# Patient Record
Sex: FEMALE | Race: OTHER | NOT HISPANIC OR LATINO | URBAN - METROPOLITAN AREA
[De-identification: names, ages, dates, MRNs, and addresses within clinical notes are randomized per-mention and may not be internally consistent; named-entity substitution may affect disease eponyms.]

---

## 2021-10-25 ENCOUNTER — OFFICE VISIT (OUTPATIENT)
Dept: URBAN - METROPOLITAN AREA CLINIC 63 | Facility: CLINIC | Age: 66
End: 2021-10-25

## 2022-01-27 ENCOUNTER — OFFICE VISIT (OUTPATIENT)
Dept: URBAN - METROPOLITAN AREA SURGERY CENTER 4 | Facility: SURGERY CENTER | Age: 67
End: 2022-01-27

## 2022-01-31 LAB — PATHOLOGY (INDENTED REPORT): (no result)

## 2022-02-24 ENCOUNTER — OFFICE VISIT (OUTPATIENT)
Dept: URBAN - METROPOLITAN AREA CLINIC 60 | Facility: CLINIC | Age: 67
End: 2022-02-24

## 2022-03-24 ENCOUNTER — OFFICE VISIT (OUTPATIENT)
Dept: URBAN - METROPOLITAN AREA CLINIC 60 | Facility: CLINIC | Age: 67
End: 2022-03-24

## 2022-04-26 ENCOUNTER — OFFICE VISIT (OUTPATIENT)
Dept: URBAN - METROPOLITAN AREA CLINIC 60 | Facility: CLINIC | Age: 67
End: 2022-04-26

## 2022-07-09 ENCOUNTER — TELEPHONE ENCOUNTER (OUTPATIENT)
Dept: URBAN - METROPOLITAN AREA CLINIC 121 | Facility: CLINIC | Age: 67
End: 2022-07-09

## 2022-07-09 RX ORDER — DAPAGLIFLOZIN 5 MG/1
TABLET, FILM COATED ORAL
Refills: 0 | OUTPATIENT
Start: 2021-09-01 | End: 2021-10-25

## 2022-07-09 RX ORDER — METFORMIN HYDROCHLORIDE 850 MG/1
TABLET ORAL TWICE A DAY
Refills: 0 | OUTPATIENT
Start: 2017-06-28 | End: 2017-08-08

## 2022-07-09 RX ORDER — OMEPRAZOLE 20 MG/1
CAPSULE, DELAYED RELEASE ORAL
Refills: 0 | OUTPATIENT
Start: 2021-08-31 | End: 2021-10-25

## 2022-07-09 RX ORDER — OMEPRAZOLE 40 MG/1
CAPSULE, DELAYED RELEASE ORAL
Refills: 0 | OUTPATIENT
Start: 2017-10-30 | End: 2017-11-30

## 2022-07-09 RX ORDER — SIMVASTATIN 40 MG/1
TABLET, FILM COATED ORAL
Refills: 0 | OUTPATIENT
Start: 2017-11-30 | End: 2017-11-30

## 2022-07-09 RX ORDER — GABAPENTIN 100 MG/1
3 CAPSULE HS CAPSULE ORAL
Refills: 0 | OUTPATIENT
Start: 2017-11-30 | End: 2017-12-28

## 2022-07-09 RX ORDER — ACETAMINOPHEN/DIPHENHYDRAMINE 500MG-25MG
TABLET ORAL
Refills: 0 | OUTPATIENT
Start: 2017-06-28 | End: 2017-08-08

## 2022-07-09 RX ORDER — OMEPRAZOLE 40 MG/1
CAPSULE, DELAYED RELEASE ORAL
Refills: 0 | OUTPATIENT
Start: 2017-08-08 | End: 2017-10-30

## 2022-07-09 RX ORDER — SIMVASTATIN 40 MG/1
TABLET, FILM COATED ORAL
Refills: 0 | OUTPATIENT
Start: 2017-08-08 | End: 2017-10-30

## 2022-07-09 RX ORDER — ASPIRIN 81 MG/1
TABLET, CHEWABLE ORAL TWICE A DAY
Refills: 0 | OUTPATIENT
Start: 2017-12-28 | End: 2021-10-25

## 2022-07-09 RX ORDER — HYDROCHLOROTHIAZIDE 12.5 MG/1
TABLET ORAL
Refills: 0 | OUTPATIENT
Start: 2021-08-31 | End: 2021-10-25

## 2022-07-09 RX ORDER — SPIRONOLACTONE 25 MG
TABLET ORAL
Refills: 0 | OUTPATIENT
Start: 2017-06-28 | End: 2017-08-08

## 2022-07-09 RX ORDER — HYDROCHLOROTHIAZIDE 12.5 MG/1
TABLET ORAL
Refills: 0 | OUTPATIENT
Start: 2017-12-28 | End: 2021-10-25

## 2022-07-09 RX ORDER — SIMVASTATIN 40 MG/1
TABLET, FILM COATED ORAL
Refills: 0 | OUTPATIENT
Start: 2017-11-30 | End: 2017-12-28

## 2022-07-09 RX ORDER — METFORMIN HCL 500 MG/1
TABLET ORAL
Refills: 0 | OUTPATIENT
Start: 2021-08-31 | End: 2021-10-25

## 2022-07-09 RX ORDER — HYDROCHLOROTHIAZIDE 12.5 MG/1
TABLET ORAL
Refills: 0 | OUTPATIENT
Start: 2017-10-30 | End: 2017-11-30

## 2022-07-09 RX ORDER — SUCRALFATE 1 G/1
TABLET ORAL TWICE A DAY
Refills: 0 | OUTPATIENT
Start: 2017-12-28 | End: 2021-10-25

## 2022-07-09 RX ORDER — SPIRONOLACTONE 25 MG
TABLET ORAL
Refills: 0 | OUTPATIENT
Start: 2017-11-30 | End: 2017-12-28

## 2022-07-09 RX ORDER — SIMVASTATIN 40 MG/1
TABLET, FILM COATED ORAL
Refills: 0 | OUTPATIENT
Start: 2017-12-28 | End: 2021-10-25

## 2022-07-09 RX ORDER — ASPIRIN 81 MG/1
TABLET, CHEWABLE ORAL TWICE A DAY
Refills: 0 | OUTPATIENT
Start: 2017-11-30 | End: 2017-12-28

## 2022-07-09 RX ORDER — SPIRONOLACTONE 25 MG
TABLET ORAL
Refills: 0 | OUTPATIENT
Start: 2017-08-08 | End: 2017-10-30

## 2022-07-09 RX ORDER — HYDROCHLOROTHIAZIDE 12.5 MG/1
TABLET ORAL
Refills: 0 | OUTPATIENT
Start: 2017-08-08 | End: 2017-10-30

## 2022-07-09 RX ORDER — OMEPRAZOLE 40 MG/1
CAPSULE, DELAYED RELEASE ORAL
Refills: 0 | OUTPATIENT
Start: 2017-06-28 | End: 2017-08-08

## 2022-07-09 RX ORDER — LOSARTAN POTASSIUM 100 MG/1
TABLET, FILM COATED ORAL
Refills: 0 | OUTPATIENT
Start: 2017-12-28 | End: 2021-10-25

## 2022-07-09 RX ORDER — HYDROCHLOROTHIAZIDE 12.5 MG/1
TABLET ORAL
Refills: 0 | OUTPATIENT
Start: 2017-11-30 | End: 2017-12-28

## 2022-07-09 RX ORDER — LOSARTAN POTASSIUM 100 MG/1
TABLET, FILM COATED ORAL
Refills: 0 | OUTPATIENT
Start: 2017-08-08 | End: 2017-10-30

## 2022-07-09 RX ORDER — SOLIFENACIN SUCCINATE 5 MG/1
TABLET, FILM COATED ORAL
Refills: 0 | OUTPATIENT
Start: 2021-08-31 | End: 2021-10-25

## 2022-07-09 RX ORDER — OMEPRAZOLE 40 MG/1
TAKE ONE CAPSULE BY MOUTH TWICE A DAY CAPSULE, DELAYED RELEASE ORAL
Refills: 0 | OUTPATIENT
Start: 2017-11-29 | End: 2017-11-30

## 2022-07-09 RX ORDER — GABAPENTIN 100 MG/1
3 CAPSULE HS CAPSULE ORAL
Refills: 0 | OUTPATIENT
Start: 2017-12-28 | End: 2021-10-25

## 2022-07-09 RX ORDER — SPIRONOLACTONE 25 MG
TABLET ORAL
Refills: 0 | OUTPATIENT
Start: 2017-12-28 | End: 2021-10-25

## 2022-07-09 RX ORDER — CETIRIZINE HYDROCHLORIDE 5 MG/1
TABLET ORAL TWICE A DAY
Refills: 0 | OUTPATIENT
Start: 2017-06-28 | End: 2017-08-08

## 2022-07-09 RX ORDER — LOSARTAN POTASSIUM 100 MG/1
TABLET, FILM COATED ORAL
Refills: 0 | OUTPATIENT
Start: 2021-08-31 | End: 2021-10-25

## 2022-07-09 RX ORDER — LOSARTAN POTASSIUM 100 MG/1
TABLET, FILM COATED ORAL
Refills: 0 | OUTPATIENT
Start: 2017-10-30 | End: 2017-11-30

## 2022-07-09 RX ORDER — LOSARTAN POTASSIUM 100 MG/1
TABLET, FILM COATED ORAL
Refills: 0 | OUTPATIENT
Start: 2017-11-30 | End: 2017-12-28

## 2022-07-09 RX ORDER — SIMVASTATIN 40 MG/1
TABLET, FILM COATED ORAL
Refills: 0 | OUTPATIENT
Start: 2017-10-30 | End: 2017-11-30

## 2022-07-09 RX ORDER — SPIRONOLACTONE 25 MG
TABLET ORAL
Refills: 0 | OUTPATIENT
Start: 2017-10-30 | End: 2017-11-30

## 2022-07-09 RX ORDER — CLOTRIMAZOLE AND BETAMETHASONE DIPROPIONATE 10; .5 MG/G; MG/G
CREAM TOPICAL TWICE A DAY
Refills: 0 | OUTPATIENT
Start: 2021-10-25 | End: 2022-04-26

## 2022-07-09 RX ORDER — GABAPENTIN 100 MG/1
3 CAPSULE HS CAPSULE ORAL
Refills: 0 | OUTPATIENT
Start: 2017-06-28 | End: 2017-08-08

## 2022-07-09 RX ORDER — OMEPRAZOLE 40 MG/1
CAPSULE, DELAYED RELEASE ORAL TWICE A DAY
Refills: 0 | OUTPATIENT
Start: 2017-12-28 | End: 2021-10-25

## 2022-07-09 RX ORDER — ASPIRIN 81 MG/1
TABLET, CHEWABLE ORAL TWICE A DAY
Refills: 0 | OUTPATIENT
Start: 2017-08-08 | End: 2017-10-30

## 2022-07-09 RX ORDER — SUCRALFATE 1 G/1
TWICE A DAY TABLET ORAL TWICE A DAY
Refills: 0 | OUTPATIENT
Start: 2022-02-24 | End: 2022-04-26

## 2022-07-09 RX ORDER — SUCRALFATE 1 G/1
TWICE A DAY TABLET ORAL TWICE A DAY
Refills: 3 | OUTPATIENT
Start: 2017-11-30 | End: 2017-12-28

## 2022-07-09 RX ORDER — ACETAMINOPHEN/DIPHENHYDRAMINE 500MG-25MG
TABLET ORAL
Refills: 0 | OUTPATIENT
Start: 2017-11-30 | End: 2017-12-28

## 2022-07-09 RX ORDER — OMEPRAZOLE 40 MG/1
CAPSULE, DELAYED RELEASE ORAL TWICE A DAY
Refills: 0 | OUTPATIENT
Start: 2017-11-30 | End: 2017-12-28

## 2022-07-09 RX ORDER — LOSARTAN POTASSIUM 100 MG/1
TABLET, FILM COATED ORAL
Refills: 0 | OUTPATIENT
Start: 2017-06-28 | End: 2017-08-08

## 2022-07-09 RX ORDER — ACETAMINOPHEN/DIPHENHYDRAMINE 500MG-25MG
TABLET ORAL
Refills: 0 | OUTPATIENT
Start: 2017-08-08 | End: 2017-11-30

## 2022-07-09 RX ORDER — ACETAMINOPHEN/DIPHENHYDRAMINE 500MG-25MG
TABLET ORAL
Refills: 0 | OUTPATIENT
Start: 2017-12-28 | End: 2021-10-25

## 2022-07-09 RX ORDER — HYDROCHLOROTHIAZIDE 12.5 MG/1
TABLET ORAL
Refills: 0 | OUTPATIENT
Start: 2017-06-28 | End: 2017-08-08

## 2022-07-09 RX ORDER — GABAPENTIN 100 MG/1
3 CAPSULE HS CAPSULE ORAL
Refills: 0 | OUTPATIENT
Start: 2017-08-08 | End: 2017-11-30

## 2022-07-09 RX ORDER — OMEPRAZOLE 40 MG/1
TWICE A DAY CAPSULE, DELAYED RELEASE ORAL TWICE A DAY
Refills: 0 | OUTPATIENT
Start: 2017-10-30 | End: 2017-11-29

## 2022-07-09 RX ORDER — ASPIRIN 81 MG/1
TABLET, CHEWABLE ORAL TWICE A DAY
Refills: 0 | OUTPATIENT
Start: 2017-10-30 | End: 2017-11-30

## 2022-07-09 RX ORDER — ASPIRIN 81 MG/1
TABLET, CHEWABLE ORAL TWICE A DAY
Refills: 0 | OUTPATIENT
Start: 2017-06-28 | End: 2017-08-08

## 2022-07-10 ENCOUNTER — TELEPHONE ENCOUNTER (OUTPATIENT)
Dept: URBAN - METROPOLITAN AREA CLINIC 121 | Facility: CLINIC | Age: 67
End: 2022-07-10

## 2022-07-10 RX ORDER — AMOXICILLIN 500 MG/1
2 TWICE A DAY CAPSULE ORAL
Refills: 0 | Status: ACTIVE | COMMUNITY
Start: 2017-10-30

## 2022-07-10 RX ORDER — LOSARTAN POTASSIUM 100 MG/1
TABLET, FILM COATED ORAL ONCE A DAY
Refills: 0 | Status: ACTIVE | COMMUNITY
Start: 2021-10-25

## 2022-07-10 RX ORDER — VITAMIN B COMPLEX
CAPSULE ORAL ONCE A DAY
Refills: 0 | Status: ACTIVE | COMMUNITY
Start: 2021-10-25

## 2022-07-10 RX ORDER — OMEPRAZOLE 20 MG/1
CAPSULE, DELAYED RELEASE ORAL ONCE A DAY
Refills: 0 | Status: ACTIVE | COMMUNITY
Start: 2021-10-25

## 2022-07-10 RX ORDER — SPIRONOLACTONE 25 MG
TABLET ORAL ONCE A DAY
Refills: 0 | Status: ACTIVE | COMMUNITY
Start: 2021-10-25

## 2022-07-10 RX ORDER — SOLIFENACIN SUCCINATE 5 MG/1
TABLET, FILM COATED ORAL ONCE A DAY
Refills: 0 | Status: ACTIVE | COMMUNITY
Start: 2021-10-25

## 2022-07-10 RX ORDER — HYDROCHLOROTHIAZIDE 12.5 MG/1
TABLET ORAL ONCE A DAY
Refills: 0 | Status: ACTIVE | COMMUNITY
Start: 2021-10-25

## 2022-07-10 RX ORDER — CLARITHROMYCIN 500 MG/1
TWICE A DAY TABLET ORAL TWICE A DAY
Refills: 0 | Status: ACTIVE | COMMUNITY
Start: 2017-10-30

## 2022-07-10 RX ORDER — SUCRALFATE 1 G/1
TABLET ORAL TWICE A DAY
Refills: 0 | Status: ACTIVE | COMMUNITY
Start: 2022-04-26

## 2022-07-10 RX ORDER — METFORMIN HCL 500 MG/1
TABLET ORAL TWICE A DAY
Refills: 0 | Status: ACTIVE | COMMUNITY
Start: 2021-10-25

## 2022-07-10 RX ORDER — DAPAGLIFLOZIN 5 MG/1
TABLET, FILM COATED ORAL ONCE A DAY
Refills: 0 | Status: ACTIVE | COMMUNITY
Start: 2021-10-25

## 2022-07-10 RX ORDER — OMEPRAZOLE 20 MG/1
ONCE A DAY CAPSULE, DELAYED RELEASE ORAL ONCE A DAY
Refills: 0 | Status: ACTIVE | COMMUNITY
Start: 2022-04-26

## 2022-07-10 RX ORDER — OMEPRAZOLE 40 MG/1
ONCE A DAY CAPSULE, DELAYED RELEASE ORAL ONCE A DAY
Refills: 0 | Status: ACTIVE | COMMUNITY
Start: 2022-02-24

## 2022-08-10 ENCOUNTER — ERX REFILL RESPONSE (OUTPATIENT)
Dept: URBAN - METROPOLITAN AREA CLINIC 63 | Facility: CLINIC | Age: 67
End: 2022-08-10

## 2022-08-10 RX ORDER — OMEPRAZOLE 20 MG/1
TAKE ONE CAPSULE BY MOUTH EVERY DAY CAPSULE, DELAYED RELEASE ORAL
Qty: 90 CAPSULE | Refills: 0 | OUTPATIENT

## 2022-08-10 RX ORDER — OMEPRAZOLE 20 MG/1
ONCE A DAY CAPSULE, DELAYED RELEASE ORAL ONCE A DAY
Refills: 0 | OUTPATIENT

## 2023-03-14 ENCOUNTER — OFFICE VISIT (OUTPATIENT)
Dept: URBAN - METROPOLITAN AREA CLINIC 60 | Facility: CLINIC | Age: 68
End: 2023-03-14
Payer: MEDICARE

## 2023-03-14 VITALS
HEART RATE: 81 BPM | HEIGHT: 60 IN | DIASTOLIC BLOOD PRESSURE: 80 MMHG | TEMPERATURE: 97.9 F | OXYGEN SATURATION: 100 % | RESPIRATION RATE: 20 BRPM | WEIGHT: 182 LBS | BODY MASS INDEX: 35.73 KG/M2 | SYSTOLIC BLOOD PRESSURE: 130 MMHG

## 2023-03-14 DIAGNOSIS — K63.5 POLYP OF COLON, UNSPECIFIED PART OF COLON, UNSPECIFIED TYPE: ICD-10-CM

## 2023-03-14 DIAGNOSIS — K59.01 SLOW TRANSIT CONSTIPATION: ICD-10-CM

## 2023-03-14 PROBLEM — 35298007: Status: ACTIVE | Noted: 2023-03-14

## 2023-03-14 PROCEDURE — 99214 OFFICE O/P EST MOD 30 MIN: CPT | Performed by: NURSE PRACTITIONER

## 2023-03-14 RX ORDER — OMEPRAZOLE 40 MG/1
ONCE A DAY CAPSULE, DELAYED RELEASE ORAL ONCE A DAY
Refills: 0 | Status: ACTIVE | COMMUNITY
Start: 2022-02-24

## 2023-03-14 RX ORDER — LOSARTAN POTASSIUM 100 MG/1
TABLET, FILM COATED ORAL ONCE A DAY
Refills: 0 | Status: ACTIVE | COMMUNITY
Start: 2021-10-25

## 2023-03-14 RX ORDER — HYDROCHLOROTHIAZIDE 12.5 MG/1
TABLET ORAL ONCE A DAY
Refills: 0 | Status: ACTIVE | COMMUNITY
Start: 2021-10-25

## 2023-03-14 RX ORDER — METFORMIN HCL 500 MG/1
TABLET ORAL TWICE A DAY
Refills: 0 | Status: ACTIVE | COMMUNITY
Start: 2021-10-25

## 2023-03-14 RX ORDER — SOLIFENACIN SUCCINATE 5 MG/1
TABLET, FILM COATED ORAL ONCE A DAY
Refills: 0 | Status: ACTIVE | COMMUNITY
Start: 2021-10-25

## 2023-03-14 RX ORDER — VITAMIN B COMPLEX
CAPSULE ORAL ONCE A DAY
Refills: 0 | Status: ACTIVE | COMMUNITY
Start: 2021-10-25

## 2023-03-14 RX ORDER — DAPAGLIFLOZIN 5 MG/1
TABLET, FILM COATED ORAL ONCE A DAY
Refills: 0 | Status: ACTIVE | COMMUNITY
Start: 2021-10-25

## 2023-03-14 NOTE — HPI-HPI
Patient comes for screening colonoscopy, denies rectal bleeding or change in her bowel, denies family h/o colon cancer. Will plan colonoscopy. Discussion [Use for free text]. Discussed with patient the importance of remaining  upright after eating, avoid tight clothing, reduce stress, and elevate the head of the bed. Discussed dietary restrictions pertaining to Gastritis Information sheet reviewed and a copy provided.   8/17 Patient is here for his colonoscopy report and also with the complaint of symptoms of gastritis, and reflux. Colonoscopy with evidence of one hyperplastic polyp, diverticular disease, and internal hemorrhoids. Colon recall in 5 year. Patient will do diet and start PPI.  10/17 Patient EGD with evidence of medium H hernia, esophageal mucosa with  damage due to reflux and chronic gastritis with H Pylori infection, patient will do diet and treatment bellow.  11/17 Patient is feeling better of her symptoms of reflux and gastritis, she has finished her treatment for H Pylori. Plan: Patient will do diet and treatment below. Also, will have Urea breath test. 12/17 Patient is feeling better, her Urea breath test is negative. Patient will continue with her treatment for a month and if she is doing well will DC it. 10/21: Patient with dyspepsia, worsen for the last few months, heartburn Will plan EGD patient had personal h/o H pylori and midsize hiatal hernia, distal esophageal biopsies were negative for Ramos's esophagus. Patient will need a colonoscopy in 1 year.  2/22 Patient EGD shows evidence of chronic gastritis, small hiatal hernia, esophageal mucosal changes suspicious for short segment Ramos's esophagus.  Biopsy results: Duodenal mucosa without specific pathologic logic findings stomach, antrum biopsy shows focal minimal chronic gastritis, negative for H. pylori organism.  Lower third esophageal biopsy: Esophageal and gastric mucosa with mild reflux type changes, focal intestinal metaplasia, and chronic inflammation compatible with Ramos esophagus. Plan: Patient will do diet and treatment for gastritis and esophagitis. EGD in 1 year. Upper GI series  4/22 Patient here today in good general state.  She says symptoms of gastritis and reflux are under control with the use of Carafate and omeprazole. Upper GI series shows evidence of small hiatal hernia.  Plan: Patient will continue with diet for gastritis and esophagitis.  Continue with omeprazole 20 mg once a day. 3/23 Patient here today complaining of constipation.  Constipation started several months ago.  Denies any other GI symptoms.  She also here for her surveillance colonoscopy, her last colonoscopy was done around 6 years ago.  Shows evidence of benign polyps.  She denies any rectal bleeding or any other GI symptoms.

## 2023-03-21 ENCOUNTER — LAB OUTSIDE AN ENCOUNTER (OUTPATIENT)
Dept: URBAN - METROPOLITAN AREA CLINIC 60 | Facility: CLINIC | Age: 68
End: 2023-03-21

## 2023-05-04 ENCOUNTER — OFFICE VISIT (OUTPATIENT)
Dept: URBAN - METROPOLITAN AREA SURGERY CENTER 4 | Facility: SURGERY CENTER | Age: 68
End: 2023-05-04
Payer: MEDICARE

## 2023-05-04 ENCOUNTER — CLAIMS CREATED FROM THE CLAIM WINDOW (OUTPATIENT)
Dept: URBAN - METROPOLITAN AREA CLINIC 4 | Facility: CLINIC | Age: 68
End: 2023-05-04
Payer: MEDICARE

## 2023-05-04 DIAGNOSIS — D12.0 POLYP OF CECUM: ICD-10-CM

## 2023-05-04 DIAGNOSIS — D12.5 SIGMOID POLYP: ICD-10-CM

## 2023-05-04 DIAGNOSIS — Z86.010 COLON POLYP HISTORY: ICD-10-CM

## 2023-05-04 DIAGNOSIS — K57.30 DVRTCLOS OF LG INT W/O PERFORATION OR ABSCESS W/O BLEEDING: ICD-10-CM

## 2023-05-04 DIAGNOSIS — K63.89 OTHER SPECIFIED DISEASES OF INTESTINE: ICD-10-CM

## 2023-05-04 PROCEDURE — 45380 COLONOSCOPY AND BIOPSY: CPT | Performed by: INTERNAL MEDICINE

## 2023-05-04 PROCEDURE — 88305 TISSUE EXAM BY PATHOLOGIST: CPT | Performed by: PATHOLOGY

## 2023-05-04 RX ORDER — OMEPRAZOLE 40 MG/1
ONCE A DAY CAPSULE, DELAYED RELEASE ORAL ONCE A DAY
Refills: 0 | Status: ACTIVE | COMMUNITY
Start: 2022-02-24

## 2023-05-04 RX ORDER — SOLIFENACIN SUCCINATE 5 MG/1
TABLET, FILM COATED ORAL ONCE A DAY
Refills: 0 | Status: ACTIVE | COMMUNITY
Start: 2021-10-25

## 2023-05-04 RX ORDER — METFORMIN HCL 500 MG/1
TABLET ORAL TWICE A DAY
Refills: 0 | Status: ACTIVE | COMMUNITY
Start: 2021-10-25

## 2023-05-04 RX ORDER — VITAMIN B COMPLEX
CAPSULE ORAL ONCE A DAY
Refills: 0 | Status: ACTIVE | COMMUNITY
Start: 2021-10-25

## 2023-05-04 RX ORDER — DAPAGLIFLOZIN 5 MG/1
TABLET, FILM COATED ORAL ONCE A DAY
Refills: 0 | Status: ACTIVE | COMMUNITY
Start: 2021-10-25

## 2023-05-04 RX ORDER — HYDROCHLOROTHIAZIDE 12.5 MG/1
TABLET ORAL ONCE A DAY
Refills: 0 | Status: ACTIVE | COMMUNITY
Start: 2021-10-25

## 2023-05-04 RX ORDER — LOSARTAN POTASSIUM 100 MG/1
TABLET, FILM COATED ORAL ONCE A DAY
Refills: 0 | Status: ACTIVE | COMMUNITY
Start: 2021-10-25

## 2023-05-25 ENCOUNTER — DASHBOARD ENCOUNTERS (OUTPATIENT)
Age: 68
End: 2023-05-25

## 2023-05-25 ENCOUNTER — OFFICE VISIT (OUTPATIENT)
Dept: URBAN - METROPOLITAN AREA CLINIC 60 | Facility: CLINIC | Age: 68
End: 2023-05-25
Payer: MEDICARE

## 2023-05-25 VITALS
BODY MASS INDEX: 35.73 KG/M2 | DIASTOLIC BLOOD PRESSURE: 90 MMHG | HEIGHT: 60 IN | WEIGHT: 182 LBS | SYSTOLIC BLOOD PRESSURE: 120 MMHG | OXYGEN SATURATION: 95 % | HEART RATE: 83 BPM | RESPIRATION RATE: 20 BRPM | TEMPERATURE: 97.8 F

## 2023-05-25 DIAGNOSIS — K64.1 GRADE II HEMORRHOIDS: ICD-10-CM

## 2023-05-25 DIAGNOSIS — K57.90 DIVERTICULAR DISEASE: ICD-10-CM

## 2023-05-25 DIAGNOSIS — K63.5 POLYP OF COLON, UNSPECIFIED PART OF COLON, UNSPECIFIED TYPE: ICD-10-CM

## 2023-05-25 PROBLEM — 397881000: Status: ACTIVE | Noted: 2023-05-25

## 2023-05-25 PROCEDURE — 99213 OFFICE O/P EST LOW 20 MIN: CPT | Performed by: NURSE PRACTITIONER

## 2023-05-25 RX ORDER — HYDROXYCHLOROQUINE SULFATE 200 MG/1
AS DIRECTED TABLET, FILM COATED ORAL
Status: ACTIVE | COMMUNITY

## 2023-05-25 RX ORDER — HYDROCHLOROTHIAZIDE 12.5 MG/1
TABLET ORAL ONCE A DAY
Refills: 0 | Status: ACTIVE | COMMUNITY
Start: 2021-10-25

## 2023-05-25 RX ORDER — SOLIFENACIN SUCCINATE 5 MG/1
TABLET, FILM COATED ORAL ONCE A DAY
Refills: 0 | Status: ACTIVE | COMMUNITY
Start: 2021-10-25

## 2023-05-25 RX ORDER — LOSARTAN POTASSIUM 100 MG/1
TABLET, FILM COATED ORAL ONCE A DAY
Refills: 0 | Status: ACTIVE | COMMUNITY
Start: 2021-10-25

## 2023-05-25 RX ORDER — DAPAGLIFLOZIN 5 MG/1
TABLET, FILM COATED ORAL ONCE A DAY
Refills: 0 | Status: ACTIVE | COMMUNITY
Start: 2021-10-25

## 2023-05-25 RX ORDER — OMEPRAZOLE 40 MG/1
ONCE A DAY CAPSULE, DELAYED RELEASE ORAL ONCE A DAY
Refills: 0 | Status: ACTIVE | COMMUNITY
Start: 2022-02-24

## 2023-05-25 RX ORDER — METFORMIN HCL 500 MG/1
TABLET ORAL TWICE A DAY
Refills: 0 | Status: ACTIVE | COMMUNITY
Start: 2021-10-25

## 2023-05-25 NOTE — PHYSICAL EXAM SKIN:
No rashes, no jaundice present , good turgor , no masses. PHYSICAL EXAM:    Constitutional: Patient is lying in bed comfortably.    Respiratory: No tachypnea or accessory muscle use    Cardiovascular: regular rate    Gastrointestinal: Abd soft, NT, ND    Extremities:  RLE erythema and swelling.  There is a 1.5 by 1.5cm ulceration on the medial plantar surface of the right foot.    Vascular:  Dop R AT/pop,   Dop L DP/PT    Neurological:     Skin: cellulitis in the RLE    Lymph Nodes:    Musculoskeletal:  Moving all four extremities spontaneously.    Psychiatric:

## 2023-05-25 NOTE — HPI-HPI
Patient comes for screening colonoscopy, denies rectal bleeding or change in her bowel, denies family h/o colon cancer. Will plan colonoscopy. Discussion [Use for free text]. Discussed with patient the importance of remaining  upright after eating, avoid tight clothing, reduce stress, and elevate the head of the bed. Discussed dietary restrictions pertaining to Gastritis Information sheet reviewed and a copy provided.   8/17 Patient is here for his colonoscopy report and also with the complaint of symptoms of gastritis, and reflux. Colonoscopy with evidence of one hyperplastic polyp, diverticular disease, and internal hemorrhoids. Colon recall in 5 year. Patient will do diet and start PPI.  10/17 Patient EGD with evidence of medium H hernia, esophageal mucosa with  damage due to reflux and chronic gastritis with H Pylori infection, patient will do diet and treatment bellow.  11/17 Patient is feeling better of her symptoms of reflux and gastritis, she has finished her treatment for H Pylori. Plan: Patient will do diet and treatment below. Also, will have Urea breath test. 12/17 Patient is feeling better, her Urea breath test is negative. Patient will continue with her treatment for a month and if she is doing well will DC it. 10/21: Patient with dyspepsia, worsen for the last few months, heartburn Will plan EGD patient had personal h/o H pylori and midsize hiatal hernia, distal esophageal biopsies were negative for Ramos's esophagus. Patient will need a colonoscopy in 1 year.  2/22 Patient EGD shows evidence of chronic gastritis, small hiatal hernia, esophageal mucosal changes suspicious for short segment Ramos's esophagus.  Biopsy results: Duodenal mucosa without specific pathologic logic findings stomach, antrum biopsy shows focal minimal chronic gastritis, negative for H. pylori organism.  Lower third esophageal biopsy: Esophageal and gastric mucosa with mild reflux type changes, focal intestinal metaplasia, and chronic inflammation compatible with Ramos esophagus. Plan: Patient will do diet and treatment for gastritis and esophagitis. EGD in 1 year. Upper GI series  4/22 Patient here today in good general state.  She says symptoms of gastritis and reflux are under control with the use of Carafate and omeprazole. Upper GI series shows evidence of small hiatal hernia.  Plan: Patient will continue with diet for gastritis and esophagitis.  Continue with omeprazole 20 mg once a day. 3/23 Patient here today complaining of constipation.  Constipation started several months ago.  Denies any other GI symptoms.  She also here for her surveillance colonoscopy, her last colonoscopy was done around 6 years ago.  Shows evidence of benign polyps.  She denies any rectal bleeding or any other GI symptoms. 5/23 Patient here today in good general state.  Her colonoscopy shows evidence of a 5 mm hyperplastic polyp into the cecum, a 5 mm tubular adenoma polyp into the sigmoid colon, mild diverticular disease of the sigmoid and ascending colon, and internal hemorrhoids next colonoscopy in 5-year.

## 2024-10-02 ENCOUNTER — P2P PATIENT RECORD (OUTPATIENT)
Age: 69
End: 2024-10-02

## 2024-10-03 ENCOUNTER — TELEPHONE ENCOUNTER (OUTPATIENT)
Dept: URBAN - METROPOLITAN AREA CLINIC 60 | Facility: CLINIC | Age: 69
End: 2024-10-03